# Patient Record
Sex: MALE | Race: WHITE | NOT HISPANIC OR LATINO | Employment: STUDENT | URBAN - METROPOLITAN AREA
[De-identification: names, ages, dates, MRNs, and addresses within clinical notes are randomized per-mention and may not be internally consistent; named-entity substitution may affect disease eponyms.]

---
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
## 2024-03-12 ENCOUNTER — OFFICE VISIT (OUTPATIENT)
Dept: URGENT CARE | Facility: CLINIC | Age: 19
End: 2024-03-12
Payer: OTHER GOVERNMENT

## 2024-03-12 VITALS
TEMPERATURE: 98 F | WEIGHT: 213.63 LBS | HEART RATE: 99 BPM | BODY MASS INDEX: 26.56 KG/M2 | DIASTOLIC BLOOD PRESSURE: 82 MMHG | OXYGEN SATURATION: 99 % | HEIGHT: 75 IN | SYSTOLIC BLOOD PRESSURE: 130 MMHG | RESPIRATION RATE: 18 BRPM

## 2024-03-12 DIAGNOSIS — L40.9 PSORIASIS: Primary | ICD-10-CM

## 2024-03-12 DIAGNOSIS — L21.0 PITYRIASIS IN ADULT: ICD-10-CM

## 2024-03-12 PROCEDURE — 99213 OFFICE O/P EST LOW 20 MIN: CPT | Mod: S$GLB,,, | Performed by: NURSE PRACTITIONER

## 2024-03-12 RX ORDER — TRIAMCINOLONE ACETONIDE 1 MG/G
CREAM TOPICAL 2 TIMES DAILY
Qty: 45 G | Refills: 1 | Status: SHIPPED | OUTPATIENT
Start: 2024-03-12 | End: 2025-03-12

## 2024-03-12 NOTE — PROGRESS NOTES
"Subjective:      Patient ID: Antoni Bay is a 18 y.o. male.    Vitals:  height is 6' 3" (1.905 m) and weight is 96.9 kg (213 lb 10 oz). His oral temperature is 97.6 °F (36.4 °C). His blood pressure is 130/82 and his pulse is 99. His respiration is 18 and oxygen saturation is 99%.     Chief Complaint: Rash    Patient requesting refills of medications for psoriasis and referral to dermatology.    Rash  This is a chronic problem. The current episode started more than 1 month ago. The problem has been gradually worsening since onset. The rash is diffuse.       Skin:  Positive for rash.    Pt was dx with Psoriasis when he was 12 yrs old.  Notice the herald's patch on his chest about 3 months ago.    Objective:     Physical Exam   Constitutional: He is oriented to person, place, and time.  Non-toxic appearance. He does not appear ill. No distress.   HENT:   Head: Normocephalic and atraumatic.   Nose: No congestion.   Eyes: Conjunctivae are normal. Pupils are equal, round, and reactive to light. Extraocular movement intact   Cardiovascular: Normal rate, regular rhythm, normal heart sounds and normal pulses.   Pulmonary/Chest: Effort normal and breath sounds normal. No respiratory distress. He has no wheezes.   Abdominal: Normal appearance. There is no abdominal tenderness.   Musculoskeletal: Normal range of motion.         General: Normal range of motion.      Right lower leg: No edema.      Left lower leg: No edema.   Neurological: no focal deficit. He is alert and oriented to person, place, and time.   Skin: Skin is not diaphoretic.         Comments: Noted herald's patch about 3 cm oval in shape in the center of the chest and then diffused rash that are smaller oval shaped and bright pink in color on chest and back. The chest is in a Jade tree distribution where the back is less formed.    Noted scaly silver patches to bilateral knuckles of hands, bilateral elbows and forehead.    Also has pustules to his back on " the shoulders from acne.     Psychiatric: His behavior is normal. Mood normal.   Nursing note and vitals reviewed.    Assessment:     1. Psoriasis    2. Pityriasis in adult         Plan:       Psoriasis  -     triamcinolone acetonide 0.1% (KENALOG) 0.1 % cream; Apply topically 2 (two) times daily.  Dispense: 45 g; Refill: 1  -     Ambulatory referral/consult to Dermatology    Pityriasis in adult    Cerave moisturizing lotion as needed on the psoriasis in between your Triamcinolone cream.

## 2024-08-19 DIAGNOSIS — L40.9 PSORIASIS: Primary | ICD-10-CM

## 2025-06-10 ENCOUNTER — ATHLETIC TRAINING SESSION (OUTPATIENT)
Dept: SPORTS MEDICINE | Facility: CLINIC | Age: 20
End: 2025-06-10

## 2025-06-10 DIAGNOSIS — Z00.00 HEALTHCARE MAINTENANCE: Primary | ICD-10-CM

## 2025-06-10 NOTE — PROGRESS NOTES
Antoni did not complete the required end of season paperwork following the 24-25 track season. All injuries sustained during this season are considered resolved.